# Patient Record
Sex: FEMALE | Race: WHITE
[De-identification: names, ages, dates, MRNs, and addresses within clinical notes are randomized per-mention and may not be internally consistent; named-entity substitution may affect disease eponyms.]

---

## 2019-10-24 ENCOUNTER — HOSPITAL ENCOUNTER (OUTPATIENT)
Dept: HOSPITAL 62 - SC | Age: 69
Discharge: HOME | End: 2019-10-24
Attending: OPHTHALMOLOGY
Payer: MEDICARE

## 2019-10-24 DIAGNOSIS — H25.813: Primary | ICD-10-CM

## 2019-10-24 DIAGNOSIS — H57.02: ICD-10-CM

## 2019-10-24 DIAGNOSIS — H57.03: ICD-10-CM

## 2019-10-24 DIAGNOSIS — K75.81: ICD-10-CM

## 2019-10-24 DIAGNOSIS — J98.4: ICD-10-CM

## 2019-10-24 DIAGNOSIS — H04.123: ICD-10-CM

## 2019-10-24 DIAGNOSIS — H52.4: ICD-10-CM

## 2019-10-24 PROCEDURE — V2632 POST CHMBR INTRAOCULAR LENS: HCPCS

## 2019-10-24 PROCEDURE — 66982 XCAPSL CTRC RMVL CPLX WO ECP: CPT

## 2019-10-24 PROCEDURE — 00142 ANES PX ON EYE LENS SURGERY: CPT

## 2019-10-24 RX ADMIN — CYCLOPENTOLATE HYDROCHLORIDE AND PHENYLEPHRINE HYDROCHLORIDE PRN DROP: 2; 10 SOLUTION/ DROPS OPHTHALMIC at 10:43

## 2019-10-24 RX ADMIN — TROPICAMIDE PRN DROP: 10 SOLUTION/ DROPS OPHTHALMIC at 10:43

## 2019-10-24 RX ADMIN — TETRACAINE HYDROCHLORIDE PRN DROP: 5 SOLUTION OPHTHALMIC at 10:55

## 2019-10-24 RX ADMIN — TETRACAINE HYDROCHLORIDE PRN DROP: 5 SOLUTION OPHTHALMIC at 11:23

## 2019-10-24 RX ADMIN — BESIFLOXACIN PRN DROP: 6 SUSPENSION OPHTHALMIC at 10:55

## 2019-10-24 RX ADMIN — CYCLOPENTOLATE HYDROCHLORIDE AND PHENYLEPHRINE HYDROCHLORIDE PRN DROP: 2; 10 SOLUTION/ DROPS OPHTHALMIC at 11:10

## 2019-10-24 RX ADMIN — TROPICAMIDE PRN DROP: 10 SOLUTION/ DROPS OPHTHALMIC at 10:55

## 2019-10-24 RX ADMIN — TETRACAINE HYDROCHLORIDE PRN DROP: 5 SOLUTION OPHTHALMIC at 11:10

## 2019-10-24 RX ADMIN — TETRACAINE HYDROCHLORIDE PRN DROP: 5 SOLUTION OPHTHALMIC at 10:43

## 2019-10-24 RX ADMIN — BESIFLOXACIN PRN DROP: 6 SUSPENSION OPHTHALMIC at 10:43

## 2019-10-24 RX ADMIN — BESIFLOXACIN PRN DROP: 6 SUSPENSION OPHTHALMIC at 11:54

## 2019-10-24 RX ADMIN — TROPICAMIDE PRN DROP: 10 SOLUTION/ DROPS OPHTHALMIC at 11:10

## 2019-10-24 NOTE — OPERATIVE REPORT
Operative Report-[facility]


Operative Report: 





 


DATE OF SURGERY: 10/24/2019


PREOPERATIVE DIAGNOSIS: 


1.   CATARACT, RIGHT EYE


2.   PUPIL MIOSIS, RIGHT EYE


 


POSTOPERATIVE DIAGNOSIS:


1.   CATARACT, RIGHT EYE


2.   PUPIL MIOSIS, EIGHT EYE


 


PROCEDURE PERFORMED: COMPLEX CATARACT EXTRACTION WITH INTRAOCULAR LENSES, RIGHT 

EYE


Intraocular Lens Model: MX 60E 23.0


Total Phaco Time: 8.27 CDE


SURGEON: ALVERTO ENAMORADO MD


ANESTHESIA: Topical with MAC plus intraocular phenylephrine and lidocaine 


INDICATIONS FOR SURGERY: Difficulty reading computer screen and driving at night


PROCEDURE: The patient was brought to the operating room placed on operating 

table. Topical anesthesia was administered. This consisted of instrument wipe 

pledgets soaked in a solution of 4% Xylocaine mixed with 0.75% Marcaine in a 1:2

ratio. A 2 x 1 cm pledget was placed in the superior fornix. A 1 x 1 cm pledget 

was placed in the inferior fornix. The eye was patched for 5 minutes. The patch 

and pledgets were removed. The eye was sterilely prepped and draped in the usual

manner. A lid speculum was placed in the eye. A 4-0 black silk suture was placed

around the superior and inferior rectus muscle to use as traction. A 

conjunctival peritomy was made at the 10 o'clock position. Hemostasis was 

obtained with bipolar cautery. A posterior limbal groove was created using a 

crescent knife and dissected anteriorly towards the cornea. Sharp point blade 

was used to create a paracentesis site at the 2 o'clock position. A 2.4 mm 

keratome was used into the anterior chamber through the groove. Then 0.3 mL of 

phenyl lidocaine was injected into the anterior chamber. Viscoelastic was 

injected into the anterior chamber. Pupil dilation was approximately 4.5 mm. A 

Malyugin Ring was placed stabilizing the iris. An anterior capsulotomy was 

performed using Utrata forceps in a capsulorrhexis fashion. Hydrodissection and 

hydrodelineation was performed. Phacoemulsification was performed in the divide 

and conquer technique.


Following this, that I/A unit was used to remove residual cortex. Viscoelastic 

was injected into the capsular bag. Intraocular lens were placed in the capsular

bag. The Malyugin ring haptics were removed and the ring was removed from the 

eye. The I/A unit was used to remove residual viscoelastic. The wound was seen 

to be watertight under high and low pressure and no sutures were placed. The 4-0

black silk sutures and lid speculum were removed. The eye was shielded after 

Besivance and Cosopt drops were placed. The patient tolerated the procedure well

and was sent to recovery room in good condition.

## 2019-11-19 ENCOUNTER — HOSPITAL ENCOUNTER (OUTPATIENT)
Dept: HOSPITAL 62 - SC | Age: 69
End: 2019-11-19
Attending: OPHTHALMOLOGY
Payer: MEDICARE

## 2019-11-19 DIAGNOSIS — H25.812: Primary | ICD-10-CM

## 2019-11-19 DIAGNOSIS — Z96.1: ICD-10-CM

## 2019-11-19 DIAGNOSIS — H57.03: ICD-10-CM

## 2019-11-19 PROCEDURE — V2632 POST CHMBR INTRAOCULAR LENS: HCPCS

## 2019-11-19 PROCEDURE — 66984 XCAPSL CTRC RMVL W/O ECP: CPT

## 2019-11-19 RX ADMIN — TROPICAMIDE PRN DROP: 10 SOLUTION/ DROPS OPHTHALMIC at 11:52

## 2019-11-19 RX ADMIN — BESIFLOXACIN PRN DROP: 6 SUSPENSION OPHTHALMIC at 00:00

## 2019-11-19 RX ADMIN — DORZOLAMIDE HYDROCHLORIDE AND TIMOLOL MALEATE PRN DROP: 20; 5 SOLUTION OPHTHALMIC at 00:00

## 2019-11-19 RX ADMIN — DORZOLAMIDE HYDROCHLORIDE AND TIMOLOL MALEATE PRN DROP: 20; 5 SOLUTION OPHTHALMIC at 13:02

## 2019-11-19 RX ADMIN — BESIFLOXACIN PRN DROP: 6 SUSPENSION OPHTHALMIC at 11:42

## 2019-11-19 RX ADMIN — BESIFLOXACIN PRN DROP: 6 SUSPENSION OPHTHALMIC at 11:52

## 2019-11-19 RX ADMIN — CYCLOPENTOLATE HYDROCHLORIDE AND PHENYLEPHRINE HYDROCHLORIDE PRN DROP: 2; 10 SOLUTION/ DROPS OPHTHALMIC at 12:02

## 2019-11-19 RX ADMIN — TETRACAINE HYDROCHLORIDE PRN DROP: 5 SOLUTION OPHTHALMIC at 12:36

## 2019-11-19 RX ADMIN — CYCLOPENTOLATE HYDROCHLORIDE AND PHENYLEPHRINE HYDROCHLORIDE PRN DROP: 2; 10 SOLUTION/ DROPS OPHTHALMIC at 11:42

## 2019-11-19 RX ADMIN — TROPICAMIDE PRN DROP: 10 SOLUTION/ DROPS OPHTHALMIC at 11:42

## 2019-11-19 RX ADMIN — TROPICAMIDE PRN DROP: 10 SOLUTION/ DROPS OPHTHALMIC at 12:02

## 2019-11-19 RX ADMIN — TETRACAINE HYDROCHLORIDE PRN DROP: 5 SOLUTION OPHTHALMIC at 11:42

## 2019-11-19 RX ADMIN — CYCLOPENTOLATE HYDROCHLORIDE AND PHENYLEPHRINE HYDROCHLORIDE PRN DROP: 2; 10 SOLUTION/ DROPS OPHTHALMIC at 11:52

## 2019-11-19 RX ADMIN — TETRACAINE HYDROCHLORIDE PRN DROP: 5 SOLUTION OPHTHALMIC at 12:15

## 2019-11-19 RX ADMIN — BESIFLOXACIN PRN DROP: 6 SUSPENSION OPHTHALMIC at 13:02

## 2019-11-19 NOTE — OPERATIVE REPORT
Operative Report[facility]


Operative Report: 


DATE OF SURGERY: 19 2019


PREOPERATIVE DIAGNOSIS: CATARACT, LEFT EYE.


POSTOPERATIVE DIAGNOSIS: CATARACT, LEFT EYE.


PROCEDURE PERFORMED: PHACOEMULSIFICATION WITH POSTERIOR CHAMBER INTRAOCULAR 

LENS, LEFT EYE.


Intraocular Lens Model : MX60E 23.0


Total Phaco Time: []


SURGEON: ALVERTO ENAMORADO MD


ANESTHESIA: TOPICAL WITH MAC.


INDICATIONS FOR SURGERY: Difficultly seeing computer


PROCEDURE:


The patient was brought to the Operating Room and placed on the operative table.

 Following tetracaine drops, topical anesthesia was administered. This consisted

 of instrument wipe pledgets soaked in a solution of 4% Xylocaine mixed with 

0.75% Marcaine in a 1:2 ratio. A 2 x 1 cm pledget was placed in the superior fo

rnix. A 1 x 1 cm pledget was placed in the inferior fornix. The eye was patched 

shut for 5 minutes. The patch was removed. The eye was sterilely prepped and 

draped in the usual manner. Lid speculum was placed in the eye. The pledgets 

were removed. 4-0 black silk sutures were placed around the superior and the 

inferior rectus muscles to be used as traction. A conjunctival peritomy was made

 at the 10 o'clock position. Hemostasis was obtained with bipolar cautery. A 

posterior limbal groove was created using a crescent knife and dissected 

anteriorly towards the cornea. A sharp point blade was used to create a 

paracentesis site at the 2 o'clock position. 0.2 cc non preserved Lidocaine was 

injected into the anterior chamber. A 2.4 mm keratome was used to enter the 

anterior chamber through the groove. Viscoelastic was injected into the anterior

 chamber. An anterior capsulotomy was performed using Utrata forceps in a 

capsulorrhexis fashion. Hydrodissection and hydrodelineation were performed. 

Phacoemulsification was performed in divide-and-conquer technique.


Following this, the I/A unit was used to remove residual cortex. Viscoelastic 

was injected into the capsular bag. The Intraocular lens was placed in the 

capsular bag. The I/A unit was used to remove residual viscoelastic. The wound 

was seen to be watertight under high and low pressure, and no sutures were 

placed. The intraocular lens was well centered. The pressure was adjusted in the

 eye to normal pressure. The 4-0 black silk sutures and lid speculum were 

removed. The eye was shielded after Besivance and Cosopt drops were placed. The 

patient tolerated the procedure well and was sent to the Recovery Room in good 

condition.